# Patient Record
Sex: MALE
[De-identification: names, ages, dates, MRNs, and addresses within clinical notes are randomized per-mention and may not be internally consistent; named-entity substitution may affect disease eponyms.]

---

## 2017-01-12 ENCOUNTER — TRANSCRIPTION ENCOUNTER (OUTPATIENT)
Age: 32
End: 2017-01-12

## 2017-12-20 ENCOUNTER — EMERGENCY (EMERGENCY)
Facility: HOSPITAL | Age: 32
LOS: 1 days | End: 2017-12-20
Payer: MEDICAID

## 2017-12-20 PROCEDURE — 73130 X-RAY EXAM OF HAND: CPT | Mod: 26,RT

## 2017-12-20 PROCEDURE — 12004 RPR S/N/AX/GEN/TRK7.6-12.5CM: CPT

## 2017-12-20 PROCEDURE — 99284 EMERGENCY DEPT VISIT MOD MDM: CPT | Mod: 25

## 2021-06-15 PROBLEM — Z00.00 ENCOUNTER FOR PREVENTIVE HEALTH EXAMINATION: Status: ACTIVE | Noted: 2021-06-15

## 2021-06-22 ENCOUNTER — APPOINTMENT (OUTPATIENT)
Dept: OTOLARYNGOLOGY | Facility: CLINIC | Age: 36
End: 2021-06-22
Payer: COMMERCIAL

## 2021-06-22 VITALS
SYSTOLIC BLOOD PRESSURE: 134 MMHG | TEMPERATURE: 97.5 F | BODY MASS INDEX: 37.93 KG/M2 | HEART RATE: 71 BPM | WEIGHT: 280 LBS | HEIGHT: 72 IN | DIASTOLIC BLOOD PRESSURE: 86 MMHG

## 2021-06-22 DIAGNOSIS — Z83.3 FAMILY HISTORY OF DIABETES MELLITUS: ICD-10-CM

## 2021-06-22 DIAGNOSIS — F12.90 CANNABIS USE, UNSPECIFIED, UNCOMPLICATED: ICD-10-CM

## 2021-06-22 DIAGNOSIS — Z86.19 PERSONAL HISTORY OF OTHER INFECTIOUS AND PARASITIC DISEASES: ICD-10-CM

## 2021-06-22 PROCEDURE — 31231 NASAL ENDOSCOPY DX: CPT

## 2021-06-22 PROCEDURE — 99204 OFFICE O/P NEW MOD 45 MIN: CPT | Mod: 25

## 2021-06-22 PROCEDURE — 99072 ADDL SUPL MATRL&STAF TM PHE: CPT

## 2021-06-22 RX ORDER — AZELASTINE HYDROCHLORIDE 137 UG/1
0.1 SPRAY, METERED NASAL TWICE DAILY
Qty: 1 | Refills: 3 | Status: ACTIVE | COMMUNITY
Start: 2021-06-22 | End: 1900-01-01

## 2021-06-22 RX ORDER — FLUTICASONE PROPIONATE 50 UG/1
50 SPRAY, METERED NASAL DAILY
Qty: 1 | Refills: 5 | Status: ACTIVE | COMMUNITY
Start: 2021-06-22 | End: 1900-01-01

## 2021-06-22 RX ORDER — PAROXETINE HYDROCHLORIDE 20 MG/1
20 TABLET, FILM COATED ORAL
Qty: 30 | Refills: 0 | Status: ACTIVE | COMMUNITY
Start: 2021-04-20

## 2021-06-22 NOTE — PHYSICAL EXAM
[Nasal Endoscopy Performed] : nasal endoscopy was performed, see procedure section for findings [] : septum deviated to the left [Midline] : trachea located in midline position [Normal] : no rashes [de-identified] : modeate inf turb hypertrophy

## 2021-06-22 NOTE — HISTORY OF PRESENT ILLNESS
[de-identified] : c/o loss of sense of smell - started 2 mos ago.  Almost no sense of smell.  No hx of COVID.  Has had vaccinations.  No prior sinusitis and no problems breathing thru nose.   Patient does vape

## 2021-06-22 NOTE — ASSESSMENT
[FreeTextEntry1] : Patient with loss of sense of smell for past 2 mos.  No hx of iinfection and no hx of COVID. No evidence of sinusitis at this time.  Exam unremarkable except for findings of allergic rhinitis - will  get CT of sinuses to r/o occult sinus pathology and in interim will start flonase and azelastine.  Patient also has long hx of problems breathing thru left nostril.  He does have dns to left and if no significant response to sprays may need to consider NSR in future.  follow up in about  2 mos.

## 2021-06-29 ENCOUNTER — APPOINTMENT (OUTPATIENT)
Dept: CT IMAGING | Facility: CLINIC | Age: 36
End: 2021-06-29
Payer: COMMERCIAL

## 2021-06-29 ENCOUNTER — OUTPATIENT (OUTPATIENT)
Dept: OUTPATIENT SERVICES | Facility: HOSPITAL | Age: 36
LOS: 1 days | End: 2021-06-29
Payer: COMMERCIAL

## 2021-06-29 DIAGNOSIS — R43.0 ANOSMIA: ICD-10-CM

## 2021-06-29 DIAGNOSIS — J30.9 ALLERGIC RHINITIS, UNSPECIFIED: ICD-10-CM

## 2021-06-29 PROCEDURE — 70486 CT MAXILLOFACIAL W/O DYE: CPT

## 2021-06-29 PROCEDURE — 70486 CT MAXILLOFACIAL W/O DYE: CPT | Mod: 26

## 2021-07-01 ENCOUNTER — NON-APPOINTMENT (OUTPATIENT)
Age: 36
End: 2021-07-01

## 2021-08-02 ENCOUNTER — APPOINTMENT (OUTPATIENT)
Dept: OTOLARYNGOLOGY | Facility: CLINIC | Age: 36
End: 2021-08-02
Payer: COMMERCIAL

## 2021-08-02 VITALS — WEIGHT: 280 LBS | BODY MASS INDEX: 37.93 KG/M2 | HEIGHT: 72 IN | TEMPERATURE: 98 F

## 2021-08-02 PROCEDURE — 99213 OFFICE O/P EST LOW 20 MIN: CPT

## 2021-08-02 NOTE — REASON FOR VISIT
[Subsequent Evaluation] : a subsequent evaluation for [FreeTextEntry2] : sense of smell     breathing problem

## 2021-08-02 NOTE — PHYSICAL EXAM
[] : septum deviated to the left [Midline] : trachea located in midline position [Normal] : no rashes [de-identified] : modeate inf turb hypertrophy

## 2021-08-02 NOTE — HISTORY OF PRESENT ILLNESS
[de-identified] : Here for folllow up of breathing thru nose. Still c/o breathing thru nose.  Continues to feel like he breathes better if holding his nose open on left.  States sense of smell gradually improving.

## 2021-08-02 NOTE — ASSESSMENT
[FreeTextEntry1] : Patient with gradually improving sense of smell.  Still c/o problems breathing thru left nostril.  Patient does have dns and hypertrophic turbinates.   Recommended eval with Dr Dupree for possible nsr and/or turbinate procedure.

## 2021-09-03 ENCOUNTER — APPOINTMENT (OUTPATIENT)
Dept: OTOLARYNGOLOGY | Facility: CLINIC | Age: 36
End: 2021-09-03
Payer: COMMERCIAL

## 2021-09-03 VITALS — TEMPERATURE: 98.2 F | HEIGHT: 72 IN | WEIGHT: 280 LBS | BODY MASS INDEX: 37.93 KG/M2

## 2021-09-03 DIAGNOSIS — R43.0 ANOSMIA: ICD-10-CM

## 2021-09-03 DIAGNOSIS — J30.9 ALLERGIC RHINITIS, UNSPECIFIED: ICD-10-CM

## 2021-09-03 PROCEDURE — 31231 NASAL ENDOSCOPY DX: CPT

## 2021-09-03 PROCEDURE — 99214 OFFICE O/P EST MOD 30 MIN: CPT | Mod: 25

## 2021-09-03 NOTE — HISTORY OF PRESENT ILLNESS
[de-identified] : 34 y/o M c/o L sided obstruction x 5-10 years, states cant breathe through his left side at all, notices it mostly at night when hes trying to sleep. \par Has to hold his left side to get air in, has tried breathe right strip but they always fall off. \par tested for allergies when he was a kid- WNL \par Pt saw Dr. Ozuna was given azelastine and Flonase with no relief. \par doesn’t feel any sinus pressure doesn’t get sinus infections \par Ct with mild chronic inflammatory changes \par +anosmia x 4 months, don’t know if he had covid,  slowly improving \par +nasal trauma \par \par

## 2021-09-03 NOTE — REASON FOR VISIT
[Subsequent Evaluation] : a subsequent evaluation for [FreeTextEntry2] : difficulty breathing thru left nostril

## 2021-09-03 NOTE — PROCEDURE
[FreeTextEntry6] : Procedure performed: Nasal Endoscopy- Diagnostic\par Pre-op indication(s): nasal congestion\par Post-op indication(s): nasal congestion \par Verbal and/or written consent obtained from patient\par Anterior rhinoscopy insufficient to account for symptoms\par Scope #: 3,  flexible fiber optic telescope \par The scope was introduced in the nasal passage between the middle and inferior turbinates to exam the inferior portion of the middle meatus and the fontanelle, as well as the maxillary ostia.  Next, the scope was passed medically and posteriorly to the middle turbinates to examine the sphenoethmoid recess and the superior turbinate region.\par Upon visualization the finders are as follows:\par Nasal Septum: sigmoidal septal deviation: anterior on left and posterior on the right , +caudal deflection to left with dryness. Pt also with nasal valve collapse \par Bilateral - Mucosa: boggy turbinates, Mucous: scant, Polyp: not seen, Inferior Turbinate: boggy, Middle Turbinate: moderate b/l BITH, Superior Turbinate: normal, Inferior Meatus: narrow, Middle Meatus: narrow, Super Meatus:normal, Sphenoethmoidal Recess: clear\par + superior adenoid with midline cyst \par

## 2021-09-03 NOTE — ASSESSMENT
[FreeTextEntry1] : 34 y/o M c/o left sided nasal obstruction on exam b/l BITH and DNS also with caudal deflection with crusting to the left \par +pam: nasal valve collapse b/l \par - advised nasal cone \par - Will continue Flonase. A topical steroid reduce mucosal swelling, illustrated appropriate use and how to reduce the risk of bleeding \par - Nasal irrigation and showed how to use it to maximize effectiveness \par - Risks benefits and alternatives to septonasal reconstruction and bilateral inferior turbinate reduction discussed. risks of bleeding, infection, septal hematoma, injury to the skull base, septal perforation results in whistling, crusting and bleeding as well as continued nasal obstruction, cosmetic deformity and collapse were discussed. Patient understood risks and would like to continue with the operation.\par Offered option of cosmetic reconstruction. patient  did not want to pay extra for the cosmetic piece and his main concern is his breathing - will do b/l , caudal septum swing \par - Risks benefits and alternatives of endoscopic sinus surgery with possible image guidance possible septoplasty bilateral inferior turbinate reduction discussed with patient at length. Risks discussed include but were not limited to bleeding, infection, persistent symptoms, scarring, injury to the skull base and brain and CSF leak, injury to orbit, crusting, septal hematoma, septal perforation results in whistling, crusting and bleeding as well as continued nasal obstruction etc. were discussed. Patient verbalized understanding of risks and benefits and also asked probing follow up questions which were answered to clarify details and get full understanding.\par - next visit will do pics and allergy test

## 2021-09-03 NOTE — CONSULT LETTER
[Please see my note below.] : Please see my note below. [FreeTextEntry1] : Dear Dr. YAYA JAEGER \par I had the pleasure of evaluating your patient DAYNA HUNG, thank you for allowing us to participate in their care. please see full note detailing our visit below.\par If you have any questions, please do not hesitate to call me and I would be happy to discuss further. \par \par Jonathon Dupree M.D.\par Attending Physician,  \par Department of Otolaryngology - Head and Neck Surgery\par [facility] \par Office: [phone]\par Fax: [phone]\par \par

## 2021-09-03 NOTE — END OF VISIT
Left voice mail message for Arleth Abdi [place]. [place] [facility] [place]. Question---how long should patient hold Effient prior to surgery. Awaiting return call. [FreeTextEntry3] : I personally saw and examined DAYNA HUNG in detail. I spoke to SIMIN Landers regarding the assessment and plan of care.  I preformed the procedures and I reviewed the above assessment and plan of care, and agree. I have made changes in changes in the body of the note where appropriate.\par \par

## 2021-10-21 ENCOUNTER — APPOINTMENT (OUTPATIENT)
Dept: OTOLARYNGOLOGY | Facility: CLINIC | Age: 36
End: 2021-10-21
Payer: COMMERCIAL

## 2021-10-21 VITALS
HEART RATE: 77 BPM | BODY MASS INDEX: 37.93 KG/M2 | HEIGHT: 72 IN | SYSTOLIC BLOOD PRESSURE: 131 MMHG | WEIGHT: 280 LBS | TEMPERATURE: 98 F | DIASTOLIC BLOOD PRESSURE: 76 MMHG

## 2021-10-21 DIAGNOSIS — J30.0 VASOMOTOR RHINITIS: ICD-10-CM

## 2021-10-21 DIAGNOSIS — J34.2 DEVIATED NASAL SEPTUM: ICD-10-CM

## 2021-10-21 DIAGNOSIS — R09.81 NASAL CONGESTION: ICD-10-CM

## 2021-10-21 DIAGNOSIS — J32.0 CHRONIC MAXILLARY SINUSITIS: ICD-10-CM

## 2021-10-21 DIAGNOSIS — J34.3 HYPERTROPHY OF NASAL TURBINATES: ICD-10-CM

## 2021-10-21 PROCEDURE — 99214 OFFICE O/P EST MOD 30 MIN: CPT | Mod: 25

## 2021-10-21 PROCEDURE — 31231 NASAL ENDOSCOPY DX: CPT

## 2021-10-21 NOTE — ASSESSMENT
[FreeTextEntry1] : 36 y/o M c/o left sided nasal obstruction on exam b/l BITH and DNS also with caudal deflection with crusting to the left \par +pam: nasal valve collapse b/l \par - advised nasal cone \par - Will continue Flonase. A topical steroid reduce mucosal swelling, illustrated appropriate use and how to reduce the risk of bleeding \par - Nasal irrigation and showed how to use it to maximize effectiveness \par - Risks benefits and alternatives to septonasal reconstruction and bilateral inferior turbinate reduction discussed. risks of bleeding, infection, septal hematoma, injury to the skull base, septal perforation results in whistling, crusting and bleeding as well as continued nasal obstruction, cosmetic deformity and collapse were discussed. Patient understood risks and would like to continue with the operation.\par Offered option of cosmetic reconstruction. patient  did not want to pay extra for the cosmetic piece and his main concern is his breathing - will do b/l , caudal septum swing \par - Risks benefits and alternatives of endoscopic sinus surgery with possible image guidance possible septoplasty bilateral inferior turbinate reduction discussed with patient at length. Risks discussed include but were not limited to bleeding, infection, persistent symptoms, scarring, injury to the skull base and brain and CSF leak, injury to orbit, crusting, septal hematoma, septal perforation results in whistling, crusting and bleeding as well as continued nasal obstruction etc. were discussed. Patient verbalized understanding of risks and benefits and also asked probing follow up questions which were answered to clarify details and get full understanding.\par pictures taken\par \par Allergy test performed. No local environmental allergies were reactive. Counseled patient at length on pathophysiology of allergies and likely vasomotor rhinitis. Discussed possible causes and treatments.\par

## 2021-10-21 NOTE — HISTORY OF PRESENT ILLNESS
[de-identified] : 34 y/o M c/o L sided obstruction x 5-10 years, states cant breathe through his left side at all, notices it mostly at night when hes trying to sleep. \par Has to hold his left side to get air in, has tried breathe right strip but they always fall off. \par tested for allergies when he was a kid- WNL \par Pt saw Dr. Ozuna was given azelastine and Flonase with no relief. \par doesn’t feel any sinus pressure doesn’t get sinus infections \par Ct with mild chronic inflammatory changes \par +anosmia x 4 months, don’t know if he had covid,  slowly improving \par +nasal trauma \par \par

## 2021-10-21 NOTE — CONSULT LETTER
[Please see my note below.] : Please see my note below. [FreeTextEntry1] : Dear Dr. YAYA JAEGER \par I had the pleasure of evaluating your patient DAYNA HUNG, thank you for allowing us to participate in their care. please see full note detailing our visit below.\par If you have any questions, please do not hesitate to call me and I would be happy to discuss further. \par \par Jonathon Dupree M.D.\par Attending Physician,  \par Department of Otolaryngology - Head and Neck Surgery\par [facility] \par Office: [phone]\par Fax: [phone]\par \par

## 2021-10-21 NOTE — END OF VISIT
[FreeTextEntry3] : I personally saw and examined DAYNA HUNG in detail. I spoke to SIMIN Landers regarding the assessment and plan of care.  I preformed the procedures and I reviewed the above assessment and plan of care, and agree. I have made changes in changes in the body of the note where appropriate.\par \par

## 2022-02-23 ENCOUNTER — APPOINTMENT (OUTPATIENT)
Dept: OTOLARYNGOLOGY | Facility: HOSPITAL | Age: 37
End: 2022-02-23

## 2022-03-04 ENCOUNTER — APPOINTMENT (OUTPATIENT)
Dept: OTOLARYNGOLOGY | Facility: CLINIC | Age: 37
End: 2022-03-04